# Patient Record
Sex: MALE | Race: BLACK OR AFRICAN AMERICAN
[De-identification: names, ages, dates, MRNs, and addresses within clinical notes are randomized per-mention and may not be internally consistent; named-entity substitution may affect disease eponyms.]

---

## 2017-04-28 ENCOUNTER — HOSPITAL ENCOUNTER (EMERGENCY)
Dept: HOSPITAL 72 - EMR | Age: 32
Discharge: HOME | End: 2017-04-28
Payer: COMMERCIAL

## 2017-04-28 VITALS — HEIGHT: 68 IN | BODY MASS INDEX: 25.01 KG/M2 | WEIGHT: 165 LBS

## 2017-04-28 VITALS — DIASTOLIC BLOOD PRESSURE: 97 MMHG | SYSTOLIC BLOOD PRESSURE: 153 MMHG

## 2017-04-28 DIAGNOSIS — R36.9: Primary | ICD-10-CM

## 2017-04-28 PROCEDURE — 99283 EMERGENCY DEPT VISIT LOW MDM: CPT

## 2017-04-28 PROCEDURE — 96372 THER/PROPH/DIAG INJ SC/IM: CPT

## 2017-04-28 NOTE — EMERGENCY ROOM REPORT
History of Present Illness


General


Chief Complaint:  Male Urogenital Problems


Source:  Patient, Medical Record





Present Illness


HPI


Is a 30-year-old male with a history of TB in the past.  He presents with a 

discharge that he noticed tonight.  He has one partner and had unprotected sex.

  No fever or chills.  No joint pain.  No other complaint.  No history of HIV.


Allergies:  


Coded Allergies:  


     No Known Allergies (Unverified , 6/19/16)





Patient History


Past Medical History:  see triage record, old chart reviewed


Past Surgical History:  none


Pertinent Family History:  none


Social History:  Denies: smoking


Immunizations:  other


Reviewed Nursing Documentation:  PMH: Agreed, PSxH: Agreed





Nursing Documentation-PMH


Hx Hypertension:  Yes


Hx Dialysis:  Yes - [facility]





Review of Systems


Eye:  Denies: blurred vision, eye pain


ENT:  Denies: ear pain, nose congestion, throat swelling


Respiratory:  Denies: cough, shortness of breath


Cardiovascular:  Denies: chest pain, palpitations


Gastrointestinal:  Denies: abdominal pain, diarrhea, nausea, vomiting


Genitourinary:  Reports: discharge


Musculoskeletal:  Denies: back pain, joint pain


Skin:  Denies: rash


Neurological:  Denies: headache, numbness


Endocrine:  Denies: increased thirst, increased urine


Hematologic/Lymphatic:  Denies: easy bruising


All Other Systems:  negative except mentioned in HPI





Physical Exam





Vital Signs








  Date Time  Temp Pulse Resp B/P Pulse Ox O2 Delivery O2 Flow Rate FiO2


 


4/28/17 01:47 98.1 90 14 153/97 98 Room Air  





vitals with hypertension


Sp02 EP Interpretation:  reviewed, normal


General Appearance:  well appearing, no apparent distress, alert


Head:  normocephalic, atraumatic


Eyes:  bilateral eye EOMI, bilateral eye PERRL


ENT:  hearing grossly normal, normal pharynx


Neck:  full range of motion, supple, no meningismus


Respiratory:  chest non-tender, lungs clear, normal breath sounds


Cardiovascular #1:  regular rate, rhythm, no murmur


Gastrointestinal:  normal bowel sounds, non tender, no mass, no organomegaly, 

no bruit, non-distended


Genitourinary:  other - Greenish discharge from penis.  Uncircumcised.  

Testicles normal.


Musculoskeletal:  back normal, gait/station normal, normal range of motion


Neurologic:  alert, oriented x3


Psychiatric:  mood/affect normal


Skin:  warm/dry





Medical Decision Making


Diagnostic Impression:  


 Primary Impression:  


 Urethritis


ER Course


Patient with urethritis.  Most likely diarrhea.  No evidence of systemic 

infection.  We'll discharge home.  Recommend outpatient anonymous testing for 

HIV, hepatitis, syphilis and other STDs.





Last Vital Signs








  Date Time  Temp Pulse Resp B/P Pulse Ox O2 Delivery O2 Flow Rate FiO2


 


4/28/17 01:47 98.1 90 14 153/97 98 Room Air  








Status:  improved


Disposition:  HOME, SELF-CARE


Condition:  Stable


Patient Instructions:  Urethritis, Adult





Additional Instructions:  


Followup with your Dr. in 7 days.  Recommend outpatient testing for HIV, 

hepatitis, syphilis and other STDs.  This can be done and anonymously.  Have 

your partner treated also.  Return if worse.











SAGE BARTH M.D. Apr 28, 2017 02:07

## 2018-06-30 ENCOUNTER — HOSPITAL ENCOUNTER (EMERGENCY)
Dept: HOSPITAL 72 - EMR | Age: 33
LOS: 1 days | Discharge: HOME | End: 2018-07-01
Payer: MEDICAID

## 2018-06-30 VITALS — HEIGHT: 68 IN | WEIGHT: 176 LBS | BODY MASS INDEX: 26.67 KG/M2

## 2018-06-30 DIAGNOSIS — N34.2: Primary | ICD-10-CM

## 2018-06-30 DIAGNOSIS — Z99.2: ICD-10-CM

## 2018-06-30 DIAGNOSIS — N18.6: ICD-10-CM

## 2018-06-30 DIAGNOSIS — I12.0: ICD-10-CM

## 2018-06-30 PROCEDURE — 99283 EMERGENCY DEPT VISIT LOW MDM: CPT

## 2018-06-30 PROCEDURE — 96372 THER/PROPH/DIAG INJ SC/IM: CPT

## 2018-07-01 VITALS — DIASTOLIC BLOOD PRESSURE: 100 MMHG | SYSTOLIC BLOOD PRESSURE: 141 MMHG

## 2018-07-01 VITALS — DIASTOLIC BLOOD PRESSURE: 110 MMHG | SYSTOLIC BLOOD PRESSURE: 176 MMHG
